# Patient Record
Sex: FEMALE | Race: BLACK OR AFRICAN AMERICAN | NOT HISPANIC OR LATINO | Employment: FULL TIME | URBAN - METROPOLITAN AREA
[De-identification: names, ages, dates, MRNs, and addresses within clinical notes are randomized per-mention and may not be internally consistent; named-entity substitution may affect disease eponyms.]

---

## 2017-07-19 DIAGNOSIS — M25.571 RIGHT ANKLE PAIN: Primary | ICD-10-CM

## 2017-07-20 ENCOUNTER — HOSPITAL ENCOUNTER (OUTPATIENT)
Dept: RADIOLOGY | Facility: HOSPITAL | Age: 27
Discharge: HOME | End: 2017-07-20
Attending: ORTHOPAEDIC SURGERY
Payer: OTHER GOVERNMENT

## 2017-07-20 DIAGNOSIS — M25.571 RIGHT ANKLE PAIN, UNSPECIFIED CHRONICITY: Primary | ICD-10-CM

## 2017-07-20 DIAGNOSIS — M25.571 RIGHT ANKLE PAIN, UNSPECIFIED CHRONICITY: ICD-10-CM

## 2017-07-20 PROCEDURE — 73610 X-RAY EXAM OF ANKLE: CPT | Mod: 26,RT,, | Performed by: RADIOLOGY

## 2017-07-20 PROCEDURE — 73610 X-RAY EXAM OF ANKLE: CPT | Mod: TC,PN,RT

## 2017-07-21 ENCOUNTER — HOSPITAL ENCOUNTER (OUTPATIENT)
Dept: RADIOLOGY | Facility: HOSPITAL | Age: 27
Discharge: HOME | End: 2017-07-21
Attending: NURSE PRACTITIONER
Payer: OTHER GOVERNMENT

## 2017-07-21 DIAGNOSIS — M25.571 RIGHT ANKLE PAIN: ICD-10-CM

## 2017-07-21 PROCEDURE — 73721 MRI JNT OF LWR EXTRE W/O DYE: CPT | Mod: 26,RT,, | Performed by: RADIOLOGY

## 2017-07-21 PROCEDURE — 73721 MRI JNT OF LWR EXTRE W/O DYE: CPT | Mod: TC,RT

## 2017-07-26 ENCOUNTER — OFFICE VISIT (OUTPATIENT)
Dept: ORTHOPEDICS | Facility: CLINIC | Age: 27
End: 2017-07-26
Payer: OTHER GOVERNMENT

## 2017-07-26 VITALS
BODY MASS INDEX: 31.57 KG/M2 | DIASTOLIC BLOOD PRESSURE: 55 MMHG | WEIGHT: 184.94 LBS | HEIGHT: 64 IN | HEART RATE: 78 BPM | SYSTOLIC BLOOD PRESSURE: 115 MMHG

## 2017-07-26 DIAGNOSIS — M25.571 CHRONIC PAIN OF RIGHT ANKLE: Primary | ICD-10-CM

## 2017-07-26 DIAGNOSIS — G89.29 CHRONIC PAIN OF RIGHT ANKLE: Primary | ICD-10-CM

## 2017-07-26 PROCEDURE — 99203 OFFICE O/P NEW LOW 30 MIN: CPT | Mod: 25,S$GLB,, | Performed by: ORTHOPAEDIC SURGERY

## 2017-07-26 PROCEDURE — 20605 DRAIN/INJ JOINT/BURSA W/O US: CPT | Mod: RT,S$GLB,, | Performed by: ORTHOPAEDIC SURGERY

## 2017-07-26 PROCEDURE — 3008F BODY MASS INDEX DOCD: CPT | Mod: S$GLB,,, | Performed by: ORTHOPAEDIC SURGERY

## 2017-07-26 PROCEDURE — 99999 PR PBB SHADOW E&M-EST. PATIENT-LVL III: CPT | Mod: PBBFAC,,, | Performed by: ORTHOPAEDIC SURGERY

## 2017-07-26 RX ADMIN — TRIAMCINOLONE ACETONIDE 40 MG: 40 INJECTION, SUSPENSION INTRA-ARTICULAR; INTRAMUSCULAR at 09:07

## 2017-08-03 RX ORDER — TRIAMCINOLONE ACETONIDE 40 MG/ML
40 INJECTION, SUSPENSION INTRA-ARTICULAR; INTRAMUSCULAR
Status: DISCONTINUED | OUTPATIENT
Start: 2017-07-26 | End: 2017-08-03 | Stop reason: HOSPADM

## 2017-08-04 NOTE — PROGRESS NOTES
History reviewed. No pertinent past medical history.    History reviewed. No pertinent surgical history.    Current Outpatient Prescriptions   Medication Sig    gabapentin (NEURONTIN) 300 MG capsule Take 300 mg by mouth 3 (three) times daily.    naproxen (NAPROSYN) 500 MG tablet Take 500 mg by mouth 2 (two) times daily.     No current facility-administered medications for this visit.        Review of patient's allergies indicates:  No Known Allergies    History reviewed. No pertinent family history.    Social History     Social History    Marital status:      Spouse name: N/A    Number of children: N/A    Years of education: N/A     Occupational History    Not on file.     Social History Main Topics    Smoking status: Never Smoker    Smokeless tobacco: Never Used    Alcohol use Not on file    Drug use: Unknown    Sexual activity: Not on file     Other Topics Concern    Not on file     Social History Narrative    No narrative on file       Chief Complaint:   Chief Complaint   Patient presents with    Right Ankle - Pain       Consulting Physician: Self, Aaareferral    History of present illness:    This is a 26 y.o. year old female who complains of right ankle pain following a twist on 5/30/17. Pain 4/10 and worse with activities. Pain lateral.    Review of Systems:    Constitution: Denies chills, fever, and sweats.  HENT: Denies headaches or blurry vision.  Cardiovascular: Denies chest pain or irregular heart beat.  Respiratory: Denies cough or shortness of breath.  Gastrointestinal: Denies abdominal pain, nausea, or vomiting.  Musculoskeletal:  Denies muscle cramps.  Neurological: Denies dizziness or focal weakness.  Psychiatric/Behavioral: Normal mental status.  Hematologic/Lymphatic: Denies bleeding problem or easy bruising/bleeding.  Skin: Denies rash or suspicious lesions.    Examination:    Vital Signs:    Vitals:    07/26/17 0954   BP: (!) 115/55   Pulse: 78   Weight: 83.9 kg (184 lb 15.5  "oz)   Height: 5' 4" (1.626 m)   PainSc:   4   PainLoc: Ankle       Body mass index is 31.75 kg/m².    This a well-developed, well nourished patient in no acute distress.    Alert and oriented and cooperative to examination.       Physical Exam: Right Ankle Exam     Gait:   Antalgic    Skin  Scars:   None  Rashes:  None    Inspection   Deformity:   None  Erythema:   Mild  Bruising:   none  Swelling:   Moderate  Lymphadenopathy: None    Instability:  None    Range of Motion Limited due to pain    Muscle Strength   Strength:  Not tested    Tests   Anterior drawer:  Stable  Varus tilt:  Not tested due to injury    Other   Ankle Crepitus:  Not tested due to injury  Sensation:   Normal  Achilles:  Normal  Forefoot:  Normal  Tenderness:  lateral malleoli/peroneals    Vascular Exam   Dorsalis Pedis:       Palpable  Capillary refill:    Normal          Imaging: X-rays ordered and reviewed today of the right ankle are normal. MRI shows a bone bruise and a possible partial peroneal tear.        Assessment: Chronic pain of right ankle  -     Intermediate Joint Aspiration/Injection        Plan:  We will start with an injection and a brace and see how she does. Back in 4 weeks if not better.      DISCLAIMER: This note may have been dictated using voice recognition software and may contain grammatical errors.     NOTE: Consult report sent to referring provider via EPIC EMR.  "

## 2017-08-04 NOTE — PROCEDURES
Intermediate Joint Aspiration/Injection  Date/Time: 7/26/2017 9:15 PM  Performed by: FLAVIO MERRITT  Authorized by: FLAVIO MERRITT     Consent Done?:  Yes (Verbal)  Indications:  Pain  Timeout: Prior to procedure the correct patient, procedure, and site was verified      Location:  Ankle  Site:  R ankle  Prep: Patient was prepped and draped in usual sterile fashion    Approach:  Anteromedial  Medications:  40 mg triamcinolone acetonide 40 mg/mL

## 2017-08-23 ENCOUNTER — OFFICE VISIT (OUTPATIENT)
Dept: ORTHOPEDICS | Facility: CLINIC | Age: 27
End: 2017-08-23
Payer: OTHER GOVERNMENT

## 2017-08-23 VITALS
HEIGHT: 64 IN | DIASTOLIC BLOOD PRESSURE: 59 MMHG | HEART RATE: 74 BPM | BODY MASS INDEX: 31.57 KG/M2 | WEIGHT: 184.94 LBS | SYSTOLIC BLOOD PRESSURE: 124 MMHG

## 2017-08-23 DIAGNOSIS — M25.571 CHRONIC PAIN OF RIGHT ANKLE: Primary | ICD-10-CM

## 2017-08-23 DIAGNOSIS — S93.401D SPRAIN OF RIGHT ANKLE, UNSPECIFIED LIGAMENT, SUBSEQUENT ENCOUNTER: Primary | ICD-10-CM

## 2017-08-23 DIAGNOSIS — G89.29 CHRONIC PAIN OF RIGHT ANKLE: Primary | ICD-10-CM

## 2017-08-23 PROCEDURE — 99999 PR PBB SHADOW E&M-EST. PATIENT-LVL III: CPT | Mod: PBBFAC,,, | Performed by: ORTHOPAEDIC SURGERY

## 2017-08-23 PROCEDURE — 3008F BODY MASS INDEX DOCD: CPT | Mod: S$GLB,,, | Performed by: ORTHOPAEDIC SURGERY

## 2017-08-23 PROCEDURE — 99213 OFFICE O/P EST LOW 20 MIN: CPT | Mod: S$GLB,,, | Performed by: ORTHOPAEDIC SURGERY

## 2017-08-24 NOTE — PROGRESS NOTES
History reviewed. No pertinent past medical history.    History reviewed. No pertinent surgical history.    Current Outpatient Prescriptions   Medication Sig    gabapentin (NEURONTIN) 300 MG capsule Take 300 mg by mouth 3 (three) times daily.    naproxen (NAPROSYN) 500 MG tablet Take 500 mg by mouth 2 (two) times daily.     No current facility-administered medications for this visit.        Review of patient's allergies indicates:  No Known Allergies    History reviewed. No pertinent family history.    Social History     Social History    Marital status:      Spouse name: N/A    Number of children: N/A    Years of education: N/A     Occupational History    Not on file.     Social History Main Topics    Smoking status: Never Smoker    Smokeless tobacco: Never Used    Alcohol use Not on file    Drug use: Unknown    Sexual activity: Not on file     Other Topics Concern    Not on file     Social History Narrative    No narrative on file       Chief Complaint:   Chief Complaint   Patient presents with    Right Ankle - Pain       Consulting Physician: No ref. provider found    History of present illness:    This is a 26 y.o. year old female who complains of right ankle pain following a twist on 5/30/17. Pain 3/10 and worse with activities. Pain lateral still. Better.    Review of Systems:    Constitution: Denies chills, fever, and sweats.  HENT: Denies headaches or blurry vision.  Cardiovascular: Denies chest pain or irregular heart beat.  Respiratory: Denies cough or shortness of breath.  Gastrointestinal: Denies abdominal pain, nausea, or vomiting.  Musculoskeletal:  Denies muscle cramps.  Neurological: Denies dizziness or focal weakness.  Psychiatric/Behavioral: Normal mental status.  Hematologic/Lymphatic: Denies bleeding problem or easy bruising/bleeding.  Skin: Denies rash or suspicious lesions.    Examination:    Vital Signs:    Vitals:    08/23/17 0954   BP: (!) 124/59   Pulse: 74   Weight:  "83.9 kg (184 lb 15.5 oz)   Height: 5' 4" (1.626 m)   PainSc:   3   PainLoc: Ankle       Body mass index is 31.75 kg/m².    This a well-developed, well nourished patient in no acute distress.    Alert and oriented and cooperative to examination.       Physical Exam: Right Ankle Exam     Gait:   Antalgic    Skin  Scars:   None  Rashes:  None    Inspection   Deformity:   None  Erythema:   Mild  Bruising:   none  Swelling:   mild  Lymphadenopathy: None    Instability:  None    Range of Motion full    Muscle Strength   Strength:  Normal    Tests   Anterior drawer:  Stable  Varus tilt:  Normal    Other   Ankle Crepitus:  None  Sensation:   Normal  Achilles:  Normal  Forefoot:  Normal  Tenderness:  lateral malleoli/peroneals    Vascular Exam   Dorsalis Pedis:       Palpable  Capillary refill:    Normal          Imaging: X-rays of the right ankle are normal. MRI shows a bone bruise and a possible partial peroneal tear.        Assessment: Chronic pain of right ankle        Plan: She is improving. Will start PT for strength and ROM. Continue brace as needed. Needs to avoid lift/push/pull and long standing at work. Back in 4 weeks.    DISCLAIMER: This note may have been dictated using voice recognition software and may contain grammatical errors.     NOTE: Consult report sent to referring provider via EPIC EMR.  "

## 2017-09-12 ENCOUNTER — CLINICAL SUPPORT (OUTPATIENT)
Dept: REHABILITATION | Facility: HOSPITAL | Age: 27
End: 2017-09-12
Attending: ORTHOPAEDIC SURGERY
Payer: OTHER GOVERNMENT

## 2017-09-12 DIAGNOSIS — S93.401D SPRAIN OF RIGHT ANKLE, UNSPECIFIED LIGAMENT, SUBSEQUENT ENCOUNTER: ICD-10-CM

## 2017-09-12 PROCEDURE — 97161 PT EVAL LOW COMPLEX 20 MIN: CPT | Mod: PN

## 2017-09-12 NOTE — PLAN OF CARE
"TIME RECORD    09/12/2017    Start Time:  0912  Stop Time:  0959    PROCEDURES:    TIMED  Procedure Time Min.    Start:  Stop:     Start:  Stop:     Start:  Stop:     Start:  Stop:          UNTIMED  Procedure Time Min.   Evaluation Start:0912  Stop:0959 47    Start:  Stop:      Total Timed Minutes:  0  Total Timed Units:  0  Total Untimed Units:  1  Charges Billed/# of units:  1    OUTPATIENT PHYSICAL THERAPY   PATIENT EVALUATION  Onset Date: DOI 05/30/17  Problem List Items Addressed This Visit     Right ankle sprain      Other Visit Diagnoses    None.       Treatment Diagnosis: Impaired mobility with right ankle pain    No past medical history on file.    No past surgical history on file.    has a current medication list which includes the following prescription(s): gabapentin and naproxen.    Precautions: standard  Surgical history: see above  Prior Therapy: no  History of Present Illness: Patient is a 27 yo F who presents to physical therapy with c/o right ankle pain with acute onset on 05/30/17. 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 Was barely able to drive afterwards.   Prior Level of Function: Independent  Social History:    Lives with 2 daughters 5 yo and 2 yo   Living environment: 2-story apartment [facility] (bedrooms on 2nd floor)    Stairs to enter home: 1    Railings: Inside   Employment: works for [employer], restricted at this time to 5hrs 6 days a week   Transportation: drives    Current level of function: Independent  Functional Deficits Leading to Referral/Nature of Injury: decreased tolerance to activity, impaired functional mobility including walking, standing, stair negotiation, work activities  Patient Therapy Goals: "Back to normal."      Subjective     Sue Yu states one or two episodes of ankle instability/LOB while walking, was able to catch her balance    Pain:  Location: lateral right ankle  Description: " aching  Activities Which Increase Pain: being on her feet (standing and walking)  Activities Which Decrease Pain: elevation, ice (not recently), was wearing a brace which helped, but states she stopped wearing it so she can strengthen her ankle  Pain Scale: 0/10 at best (first thing in the morning) 1/10 now  3/10 at worst    Numbness/Paraesthesias: denies    Objective     Appearance: mild swelling notable right ankle  Posture: In standing with mildly decreased weight shift to RLE vs LLE  Palpation: TTP lateral malleolus, peroneal tendons, DP pulse palpable  Sensation: grossly intact to LT  DTRs: NT  Range of Motion/Strength:      Ankle Right  Left  Pain/Dysfunction with Movement    PROM MMT PROM MMT    Dorsiflexion -4* 4/5 WFL 5/5    Plantarflexion 38* 4/5 WFL 4+/5    Inversion 26* 4-/5 WFL 4+/5    Eversion 14* 4-/5 WFL 4+/5      Unable to perform SLS to RLE, LLE >10 sec with minimal ankle strategy    Flexibility: Tightness present in bilateral gastrocs  Gait: mildly slowed  Bed Mobility: Supine<>sit Independent  Transfers: Sit<>stand Independent  Functional Outcome Measure: Lower Extremity Functional Scale (LEFS): 67/80  Treatment: Educated on role/goal PT, POC. Instructed in HEP including AROM right ankle DF/PF/IV/EV, towel scrunches, ankle alphabet. Handout issued. Pt verbalizing understanding and agreement.     Assessment       Initial Assessment (Pertinent finding, problem list and factors affecting outcome): Patient is a 27 yo F presenting to PT with c/o right ankle pain and weakness following a sprain on 05/30/17. Notable impairments include mild decreased ROM, ankle weakness, gait abnormality, and impaired functional mobility. Patient would benefit from skilled PT to address notable impairments and improve functional mobility to [facility].    Rehab Potiential: good    Short Term Goals (3 Weeks): 1) Pt will initiate HEP 2) Patient will improve right ankle strength by 1/2 muscle grade 3) Pt will be able to hold SLS  RLE for 5 sec   Long Term Goals (6 Weeks): 1) Pt will be I with HEP 2) Pt will return to community ambulation with strength 4+/5 or > and pain <2/10 3) Pt will improve LEFS to <20% impairment 4) Pt will be able to walk one mile and stand 1 hr with no difficulty I/ADL purposes    Plan     Certification Period: 09/12/17 to 10/26/17  Recommended Treatment Plan: 2-3 times per week for 6 weeks: Electrical Stimulation IFC PRN, Gait Training, Group Therapy, Manual Therapy, Moist Heat/ Ice, Neuromuscular Re-ed, Patient Education, Therapeutic Activites and Therapeutic Exercise    Thank you for referral.    Therapist: Nohemy Alejo, PT    I CERTIFY THE NEED FOR THESE SERVICES FURNISHED UNDER THIS PLAN OF TREATMENT AND WHILE UNDER MY CARE    Physician's comments: ________________________________________________________________________________________________________________________________________________      Physician's Name: ___________________________________;

## 2017-09-14 ENCOUNTER — CLINICAL SUPPORT (OUTPATIENT)
Dept: REHABILITATION | Facility: HOSPITAL | Age: 27
End: 2017-09-14
Attending: ORTHOPAEDIC SURGERY
Payer: OTHER GOVERNMENT

## 2017-09-14 DIAGNOSIS — S93.401D SPRAIN OF RIGHT ANKLE, UNSPECIFIED LIGAMENT, SUBSEQUENT ENCOUNTER: Primary | ICD-10-CM

## 2017-09-14 PROCEDURE — 97110 THERAPEUTIC EXERCISES: CPT | Mod: PN

## 2017-09-15 ENCOUNTER — OFFICE VISIT (OUTPATIENT)
Dept: ORTHOPEDICS | Facility: CLINIC | Age: 27
End: 2017-09-15
Payer: OTHER GOVERNMENT

## 2017-09-15 VITALS
HEIGHT: 64 IN | WEIGHT: 184.94 LBS | SYSTOLIC BLOOD PRESSURE: 127 MMHG | BODY MASS INDEX: 31.57 KG/M2 | HEART RATE: 65 BPM | DIASTOLIC BLOOD PRESSURE: 60 MMHG

## 2017-09-15 DIAGNOSIS — S93.401D SPRAIN OF RIGHT ANKLE, UNSPECIFIED LIGAMENT, SUBSEQUENT ENCOUNTER: Primary | ICD-10-CM

## 2017-09-15 PROCEDURE — 99999 PR PBB SHADOW E&M-EST. PATIENT-LVL III: CPT | Mod: PBBFAC,,, | Performed by: ORTHOPAEDIC SURGERY

## 2017-09-15 PROCEDURE — 99213 OFFICE O/P EST LOW 20 MIN: CPT | Mod: S$GLB,,, | Performed by: ORTHOPAEDIC SURGERY

## 2017-09-15 PROCEDURE — 3008F BODY MASS INDEX DOCD: CPT | Mod: S$GLB,,, | Performed by: ORTHOPAEDIC SURGERY

## 2017-09-18 PROBLEM — S93.401A RIGHT ANKLE SPRAIN: Status: ACTIVE | Noted: 2017-09-18

## 2017-09-18 NOTE — PROGRESS NOTES
"History reviewed. No pertinent past medical history.    History reviewed. No pertinent surgical history.    Current Outpatient Prescriptions   Medication Sig    gabapentin (NEURONTIN) 300 MG capsule Take 300 mg by mouth 3 (three) times daily.    naproxen (NAPROSYN) 500 MG tablet Take 500 mg by mouth 2 (two) times daily.     No current facility-administered medications for this visit.        Review of patient's allergies indicates:  No Known Allergies    History reviewed. No pertinent family history.    Social History     Social History    Marital status:      Spouse name: N/A    Number of children: N/A    Years of education: N/A     Occupational History    Not on file.     Social History Main Topics    Smoking status: Never Smoker    Smokeless tobacco: Never Used    Alcohol use Not on file    Drug use: Unknown    Sexual activity: Not on file     Other Topics Concern    Not on file     Social History Narrative    No narrative on file       Chief Complaint:   Chief Complaint   Patient presents with    Right Ankle - Pain       Consulting Physician: No ref. provider found    History of present illness:    This is a 26 y.o. year old female who complains of right ankle pain following a twist on 5/30/17. Pain 0/10.    Review of Systems:    Constitution: Denies chills, fever, and sweats.  HENT: Denies headaches or blurry vision.  Cardiovascular: Denies chest pain or irregular heart beat.  Respiratory: Denies cough or shortness of breath.  Gastrointestinal: Denies abdominal pain, nausea, or vomiting.  Musculoskeletal:  Denies muscle cramps.  Neurological: Denies dizziness or focal weakness.  Psychiatric/Behavioral: Normal mental status.  Hematologic/Lymphatic: Denies bleeding problem or easy bruising/bleeding.  Skin: Denies rash or suspicious lesions.    Examination:    Vital Signs:    Vitals:    09/15/17 0959   BP: 127/60   Pulse: 65   Weight: 83.9 kg (184 lb 15.5 oz)   Height: 5' 4" (1.626 m)   PainSc: " 0-No pain   PainLoc: Ankle       Body mass index is 31.75 kg/m².    This a well-developed, well nourished patient in no acute distress.    Alert and oriented and cooperative to examination.       Physical Exam: Right Ankle Exam     Gait:   normal    Skin  Scars:   None  Rashes:  None    Inspection   Deformity:   None  Erythema:   none  Bruising:   none  Swelling:   none  Lymphadenopathy: None    Instability:  None    Range of Motion full    Muscle Strength   Strength:  Normal    Tests   Anterior drawer:  Stable  Varus tilt:  Normal    Other   Ankle Crepitus:  None  Sensation:   Normal  Achilles:  Normal  Forefoot:  Normal  Tenderness:  lateral malleoli/peroneals    Vascular Exam   Dorsalis Pedis:       Palpable  Capillary refill:    Normal          Imaging: X-rays of the right ankle are normal. MRI shows a bone bruise and a possible partial peroneal tear.        Assessment: Sprain of right ankle, unspecified ligament, subsequent encounter        Plan: Doing well. No pain. Full ROM. Will release without restriction and see as needed.    DISCLAIMER: This note may have been dictated using voice recognition software and may contain grammatical errors.     NOTE: Consult report sent to referring provider via [employer].

## 2017-09-19 ENCOUNTER — TELEPHONE (OUTPATIENT)
Dept: REHABILITATION | Facility: HOSPITAL | Age: 27
End: 2017-09-19

## 2017-09-19 NOTE — PROGRESS NOTES
"Name: uSe Yu  Date:   09/14/17  Primary Diagnosis: .  Problem List Items Addressed This Visit     Right ankle sprain - Primary      Other Visit Diagnoses    None.         Time in: 0910  Time Out: 0955  Total Treatment Time: 45  Group Time: 0      Subjective:    Patient reports no changes in s/s since initial evalaution.  Patient reports their pain to be 0-1/10 on a 0-10 scale with 0 being no pain and 10 being the worst pain imaginable.    Objective    Patient received individual therapy to address strength, endurance, ROM and flexibility with 0 other patients with activities as follows:     Patient received therapeutic exercises to develop strength, endurance, ROM and flexibility for 45 minutes including:     Nustep L1 x 10 min   Standing gastroc stretch 3/30" B  Ankle 4-way c/ RTB 3x10  Gold board seated cw/ccw 3x10 each  Towel scrunches x 2 min    Assessment:     Patient tolerating treatment with no inc'd s/s. Good technique with cueing. Declines cold pack.    Pt will continue to benefit from skilled PT intervention. Medical Necessity is demonstrated by:  Pain limits function of effected part for some activities, Unable to participate fully in daily activities and Weakness.    Patient is making good progress towards established goals.    Plan:  Continue with established Plan of Care towards PT goals.     "

## 2025-04-04 ENCOUNTER — CLINICAL SUPPORT (OUTPATIENT)
Dept: OBSTETRICS AND GYNECOLOGY | Facility: CLINIC | Age: 35
End: 2025-04-04
Payer: MEDICAID

## 2025-04-04 ENCOUNTER — PATIENT MESSAGE (OUTPATIENT)
Dept: OBSTETRICS AND GYNECOLOGY | Facility: CLINIC | Age: 35
End: 2025-04-04

## 2025-04-04 DIAGNOSIS — Z3A.12 12 WEEKS GESTATION OF PREGNANCY: Primary | ICD-10-CM

## 2025-04-04 PROCEDURE — 99999 PR PBB SHADOW E&M-NEW PATIENT-LVL II: CPT | Mod: PBBFAC,,,

## 2025-04-04 PROCEDURE — 99202 OFFICE O/P NEW SF 15 MIN: CPT | Mod: PBBFAC

## 2025-04-04 NOTE — PROGRESS NOTES
Patient is currently 12 weeks 4 days. Patient is currently under care with [facility] until 20 weeks.   Spoke with patient for approximately 30 minutes during virtual visit.  Updated chart to reflect up to date patient demographics.  Allergies, medications, pharmacy, medical, surgical and family history updated. Substance and sexual activity, marital status, gender identity and OB/Gyn history updated.  Patient was guided through expectations of care during pregnancy.  first OB appts scheduled.  New pregnancy education provided & questions answered. Encouraged to send message or call office with any questions/concerns. Verbalized understanding.      Discussed with pt:     Lmp:1/6  Encouraged to begin  taking PNV daily    denies n/v:   common in 1st tri  discussed safe options per Pregnancy A to Z guide   denies cramping/spotting:   may be normal to have mild cramping/light spotting, harry in 1st tri & with sexual activity  Precautions/warning signs discussed:   encouraged to go to ED for heavy vag discharge, saturating a pad, bright red bleeding, painful cramping/abd pain that is interrupting daily activity  Encouraged and discussed healthy diet:   nothing raw; meat well done; heat deli meats & hot dogs prior to eating   avoid soft cheeses that are not fully pasteurized-ex: brie, feta, blue cheese   avoid fish high in mercury-limit canned tuna to once per week   rinse fruits/veggies thoroughly    Encouraged adequate fluids:  8-12 cups of water/healthy liquids each day  Limit caffeine to <200 mg/day (approx 1-2 cups coffee, tea or soda)   Extra rest can be beneficial, especially during the 1st trimester when it's normal to feel tired   Sleep on side rather than back or abdomen, especially as pregnancy progresses  Ok to continue to exercise but should not start anything new or more strenuous during pregnancy  Normal weight gain:  1st trimester-may not gain anything at all or up to 1-5 lbs   Total weight gain  approximately 25-35 lbs depending on starting weight   Should avoid alcohol, smoking, vaping, drugs, herbal supplements  Should check with provider regarding safety of any prescription or OTC medications  Avoid ibuprofen, motrin, advil, aleve  Ok to take regular strength tylenol for headache or body aches  Avoid cleaning cat litter box  Wear gloves with gardening   Important to wear seat belt when riding in vehicle-lower belt should be below abdomen   Will need car seat to bring baby home from hospital    Discussed normal breast changes during early pregnancy-breasts may feel full/tender   Discussed breastfeeding recommendations/benefits for mom & baby -  plans to breastfeed   Discussed need for pediatrician-will have to schedule baby's 1st appt prior to leaving the hospital to be seen within 2-3 days of d/c  Sent InJybe message to pt through pt portal with information regarding the [employer] [email or web address] for access to the Pregnancy A to Z guide and Prenatal Class schedule. Informed of the ConnectedMOM program, Get Ready to Meet Your Baby pamphlet, Co[employer] marcel and how to obtain a breast pump through insurance toward end of pregnancy

## 2025-04-22 ENCOUNTER — LAB VISIT (OUTPATIENT)
Dept: LAB | Facility: OTHER | Age: 35
End: 2025-04-22
Attending: NURSE PRACTITIONER
Payer: MEDICAID

## 2025-04-22 DIAGNOSIS — Z34.91 ENCOUNTER FOR SUPERVISION OF NORMAL PREGNANCY IN FIRST TRIMESTER, UNSPECIFIED GRAVIDITY: ICD-10-CM

## 2025-04-22 LAB
HBV SURFACE AG SERPL QL IA: NORMAL
HCT VFR BLD AUTO: 32.8 % (ref 37–48.5)
HGB BLD-MCNC: 10.4 GM/DL (ref 12–16)
HIV 1+2 AB+HIV1 P24 AG SERPL QL IA: NEGATIVE
INDIRECT COOMBS: NORMAL
RH BLD: NORMAL
SPECIMEN OUTDATE: NORMAL
T PALLIDUM IGG+IGM SER QL: NEGATIVE

## 2025-04-22 PROCEDURE — 85018 HEMOGLOBIN: CPT

## 2025-04-22 PROCEDURE — 87340 HEPATITIS B SURFACE AG IA: CPT

## 2025-04-22 PROCEDURE — 85014 HEMATOCRIT: CPT

## 2025-04-22 PROCEDURE — 86900 BLOOD TYPING SEROLOGIC ABO: CPT | Performed by: NURSE PRACTITIONER

## 2025-04-22 PROCEDURE — 87389 HIV-1 AG W/HIV-1&-2 AB AG IA: CPT

## 2025-04-22 PROCEDURE — 36415 COLL VENOUS BLD VENIPUNCTURE: CPT

## 2025-04-22 PROCEDURE — 86762 RUBELLA ANTIBODY: CPT

## 2025-04-22 PROCEDURE — 86593 SYPHILIS TEST NON-TREP QUANT: CPT

## 2025-04-23 LAB
RUBV IGG SER-ACNC: 56.9 IU/ML
RUBV IGG SER-IMP: REACTIVE

## 2025-05-09 ENCOUNTER — INITIAL PRENATAL (OUTPATIENT)
Dept: OBSTETRICS AND GYNECOLOGY | Facility: CLINIC | Age: 35
End: 2025-05-09
Payer: MEDICAID

## 2025-05-09 VITALS
BODY MASS INDEX: 36.39 KG/M2 | SYSTOLIC BLOOD PRESSURE: 124 MMHG | DIASTOLIC BLOOD PRESSURE: 72 MMHG | WEIGHT: 218.69 LBS

## 2025-05-09 DIAGNOSIS — Z3A.17 17 WEEKS GESTATION OF PREGNANCY: Primary | ICD-10-CM

## 2025-05-09 PROCEDURE — 99999 PR PBB SHADOW E&M-EST. PATIENT-LVL III: CPT | Mod: PBBFAC,,, | Performed by: OBSTETRICS & GYNECOLOGY

## 2025-05-09 PROCEDURE — 99213 OFFICE O/P EST LOW 20 MIN: CPT | Mod: PBBFAC,PN | Performed by: OBSTETRICS & GYNECOLOGY

## 2025-05-09 PROCEDURE — 99214 OFFICE O/P EST MOD 30 MIN: CPT | Mod: TH,S$PBB,, | Performed by: OBSTETRICS & GYNECOLOGY

## 2025-05-09 RX ORDER — PROGESTERONE 200 MG/1
200 CAPSULE ORAL NIGHTLY
COMMUNITY
Start: 2025-04-22

## 2025-05-12 NOTE — PROGRESS NOTES
Pregnancy dating, labs, ultrasound reports, prenatal testing, and problem list; prior records and results; and available outside records were reviewed and updated in EMR.  Pertinent findings were noted below.    Reason for Visit   Initial Prenatal Visit    HPI   34 y.o., at 17w6d by Estimated Date of Delivery: 10/13/25    She is doing well with no issues    Contractions: No   Bleeding: No   Loss of fluid: No   Fetal movement: No   Nausea: No   Vomiting: No   Headache: No     Exam   /72   Wt 99.2 kg (218 lb 11.1 oz)   BMI 36.39 kg/m²     GENERAL: No acute distress  ABD: Gravid    Assessment and Plan   17 weeks gestation of pregnancy  -     Connected MOM Enrollment  -     Assign Connected MOM Program Consent Questionnaire      Transfer from [facility]  H/O PTD on prometrium nightly  MFM scan scheduled     labor and Pain and bleeding precautions given  Follow-up: 4 weeks

## 2025-05-23 ENCOUNTER — PROCEDURE VISIT (OUTPATIENT)
Dept: MATERNAL FETAL MEDICINE | Facility: CLINIC | Age: 35
End: 2025-05-23
Payer: MEDICAID

## 2025-05-23 DIAGNOSIS — Z3A.12 12 WEEKS GESTATION OF PREGNANCY: ICD-10-CM

## 2025-05-23 DIAGNOSIS — Z36.2 ENCOUNTER FOR FOLLOW-UP ULTRASOUND OF FETAL ANATOMY: Primary | ICD-10-CM

## 2025-05-23 PROCEDURE — 76817 TRANSVAGINAL US OBSTETRIC: CPT | Mod: PBBFAC | Performed by: OBSTETRICS & GYNECOLOGY

## 2025-05-23 PROCEDURE — 76811 OB US DETAILED SNGL FETUS: CPT | Mod: 26,S$PBB,, | Performed by: OBSTETRICS & GYNECOLOGY

## 2025-05-30 ENCOUNTER — OFFICE VISIT (OUTPATIENT)
Dept: MATERNAL FETAL MEDICINE | Facility: CLINIC | Age: 35
End: 2025-05-30
Payer: MEDICAID

## 2025-05-30 ENCOUNTER — PROCEDURE VISIT (OUTPATIENT)
Dept: MATERNAL FETAL MEDICINE | Facility: CLINIC | Age: 35
End: 2025-05-30
Payer: MEDICAID

## 2025-05-30 VITALS
DIASTOLIC BLOOD PRESSURE: 60 MMHG | WEIGHT: 226.31 LBS | SYSTOLIC BLOOD PRESSURE: 121 MMHG | BODY MASS INDEX: 37.66 KG/M2

## 2025-05-30 DIAGNOSIS — Z36.2 ENCOUNTER FOR FOLLOW-UP ULTRASOUND OF FETAL ANATOMY: ICD-10-CM

## 2025-05-30 DIAGNOSIS — O09.899 H/O PRETERM DELIVERY, CURRENTLY PREGNANT: Primary | ICD-10-CM

## 2025-05-30 PROCEDURE — 99999 PR PBB SHADOW E&M-EST. PATIENT-LVL II: CPT | Mod: PBBFAC,,, | Performed by: OBSTETRICS & GYNECOLOGY

## 2025-05-30 PROCEDURE — 99212 OFFICE O/P EST SF 10 MIN: CPT | Mod: PBBFAC,TH | Performed by: OBSTETRICS & GYNECOLOGY

## 2025-05-30 RX ORDER — PROGESTERONE 200 MG/1
200 CAPSULE ORAL NIGHTLY
Qty: 30 CAPSULE | Refills: 4 | Status: SHIPPED | OUTPATIENT
Start: 2025-05-30

## 2025-05-30 NOTE — ASSESSMENT & PLAN NOTE
I counseled the patient regarding her obstetric history which is remarkable for a history of spontaneous PPROM at 35 weeks and another 36 weeks. Each delivered at around that time of the PPROM. These occurred at [facility]. She was on 17-OHP with her second pregnancy.  Recent evidence suggests that previously recommended formulations of progesterone therapy (17-alpha hydroxyprogesterone caproate) is ineffective for the prevention of recurrent  birth, and it has been withdrawn from the market at the direction of the FDA. Treatment with 17-alpha hydroxyprogesterone caproate (or its generic or compounded formulations) for prevention of recurrent  birth is not recommended. The MFM section continues to recommend cervical length screening between 16 - 22 weeks 6 days in baum gestations to identify patients who may benefit from cervical cerclage placement. The benefit of vaginal progesterone for the prevention of  birth is unclear in the absence of cervical shortening. In patients with a prior  birth and a cervical length of 25-30 mm, there is a non-significant trend to reduction in  birth, whereas in those with a cervical length < 25 mm, there appears to consistently be a reduction in the risk of premature birth.  Despite the lack of evidence that vaginal progesterone supplementation, in the form of prometrium 200 mg qhs, is beneficial in the absence of a short cervix, it may be considered after MFM consultation depending on the patient history and past use of 17-P. Whether there is additional incremental benefit of vaginal progesterone for patients who receive an ultrasound indicated cerclage remains unclear. The decision of whether to initiate or continue vaginal progesterone can be individualized.  Patient was on progesterone, however she was instructed to take it orally. She had been on it since 16 weeks, however she ran out of her Rx a few days ago and has not been on it. She would  like to restart it if possible, she was informed to use it vaginally instead.  We reviewed the reassuring CL on today's ultrasound.    Recommendations:  Continue cervical length surveillance every 2 weeks, until 22 weeks 6 days gestation  If the cervix measures <30 mm, perform weekly cervical length  Patients with a cervical length of 25 - 30 mm with a prior spontaneous PTB may benefit from vaginal progesterone, but this is less clear. Consider initiation of vaginal progesterone.  Patients with a cervical length < 25 mm may be offered cerclage, vaginal progesterone, or both after counseling with MFM.   Once a patient has a cerclage, no additional cervical length measurement is routinely recommended    Vaginal progesterone Rx sent. Continue until 36w6d.

## 2025-05-30 NOTE — PROGRESS NOTES
MATERNAL-FETAL MEDICINE   CONSULT NOTE    Provider requesting consultation: Aravind  Patient is accompanied by her mother     SUBJECTIVE:     Ms. Sue Yu is a 34 y.o.  female with IUP at 20w4d who is seen in consultation by MFM for evaluation and management of:  Problem   H/O  Delivery, Currently Pregnant     Patient has no complaints today. She is overall feeling well.  Patient denies any contractions/cramping, vaginal bleeding or leakage of fluid.       Medication List with Changes/Refills   Current Medications    FLUTICASONE (FLONASE) 50 MCG/ACTUATION NASAL SPRAY    1 spray (50 mcg total) by Each Nare route 2 (two) times daily as needed.    GABAPENTIN (NEURONTIN) 300 MG CAPSULE    Take 300 mg by mouth 3 (three) times daily.    NAPROXEN (NAPROSYN) 500 MG TABLET    Take 500 mg by mouth 2 (two) times daily.    ONDANSETRON (ZOFRAN) 4 MG TABLET    Take 1 tablet (4 mg total) by mouth every 6 (six) hours.    PROGESTERONE (PROMETRIUM) 200 MG CAPSULE    Take 200 mg by mouth every evening.      Review of patient's allergies indicates:  No Known Allergies    PMH: Past Medical History[1]   PObHx:  OB History    Para Term  AB Living   3 2  2  2   SAB IAB Ectopic Multiple Live Births       2      # Outcome Date GA Lbr Karri/2nd Weight Sex Type Anes PTL Lv   3 Current            2      F Vag-Spont   BOB   1      F Vag-Spont   BOB     PSH:Past Surgical History[2]     Family history:family history is not on file.    Social history: reports that she has never smoked. She has never used smokeless tobacco. She reports that she does not drink alcohol and does not use drugs.    Objective:   /60 (BP Location: Right arm, Patient Position: Sitting)   Wt 102.7 kg (226 lb 4.8 oz)   BMI 37.66 kg/m²     Physical Exam  deferred    Ultrasound performed. See viewpoint for full ultrasound report.  Hemphill live IUP.  Some of the previous suboptimal views are seen today and appear  unremarkable. Some of the anatomic structures remain suboptimally imaged (see above), but no abnormalities are suspected. Visualization of suboptimally imaged structures is unlikely to improve with advancing gestational age.   MVP is normal.   Transvaginal scan demonstrates a normal cervical length at 42.2 mm     Significant labs/imaging:  Aneuploidy screen: N/A    ASSESSMENT/PLAN:     34 y.o.  female with IUP at 20w4d     Assessment & Plan  H/O  delivery, currently pregnant  I counseled the patient regarding her obstetric history which is remarkable for a history of spontaneous PPROM at 35 weeks and another 36 weeks. Each delivered at around that time of the PPROM. These occurred at [facility]. She was on 17-OHP with her second pregnancy.  Recent evidence suggests that previously recommended formulations of progesterone therapy (17-alpha hydroxyprogesterone caproate) is ineffective for the prevention of recurrent  birth, and it has been withdrawn from the market at the direction of the FDA. Treatment with 17-alpha hydroxyprogesterone caproate (or its generic or compounded formulations) for prevention of recurrent  birth is not recommended. The MFM section continues to recommend cervical length screening between 16 - 22 weeks 6 days in baum gestations to identify patients who may benefit from cervical cerclage placement. The benefit of vaginal progesterone for the prevention of  birth is unclear in the absence of cervical shortening. In patients with a prior  birth and a cervical length of 25-30 mm, there is a non-significant trend to reduction in  birth, whereas in those with a cervical length < 25 mm, there appears to consistently be a reduction in the risk of premature birth.  Despite the lack of evidence that vaginal progesterone supplementation, in the form of prometrium 200 mg qhs, is beneficial in the absence of a short cervix, it may be considered after MFM  consultation depending on the patient history and past use of 17-P. Whether there is additional incremental benefit of vaginal progesterone for patients who receive an ultrasound indicated cerclage remains unclear. The decision of whether to initiate or continue vaginal progesterone can be individualized.  Patient was on progesterone, however she was instructed to take it orally. She had been on it since 16 weeks, however she ran out of her Rx a few days ago and has not been on it. She would like to restart it if possible, she was informed to use it vaginally instead.  We reviewed the reassuring CL on today's ultrasound.    Recommendations:  Continue cervical length surveillance every 2 weeks, until 22 weeks 6 days gestation  If the cervix measures <30 mm, perform weekly cervical length  Patients with a cervical length of 25 - 30 mm with a prior spontaneous PTB may benefit from vaginal progesterone, but this is less clear. Consider initiation of vaginal progesterone.  Patients with a cervical length < 25 mm may be offered cerclage, vaginal progesterone, or both after counseling with MFM.   Once a patient has a cerclage, no additional cervical length measurement is routinely recommended    Vaginal progesterone Rx sent. Continue until 36w6d.         Patient was counseled that prenatal ultrasound studies have limitations. They do not detect all fetal, genetic, placental, and maternal abnormalities. A normal appearing prenatal ultrasound is reassuring. However, it does not guarantee the absence of an abnormality or predict a normal outcome for the fetus or the mother.     FOLLOW UP:    No further MFM MD visits have been scheduled.  A follow up ultrasound will be made for 2 weeks from today for CL and completion anatomy.    The patient was given an opportunity to ask questions about the management of her high risk pregnancy problems. She expressed an understanding of and agreement to the above impression and plan. All  questions were answered to her satisfaction.        Lasha Arana MD   Maternal-Fetal Medicine      Electronically Signed by Lasha Arana May 30, 2025       [1] No past medical history on file.  [2] No past surgical history on file.

## 2025-06-06 ENCOUNTER — ROUTINE PRENATAL (OUTPATIENT)
Dept: OBSTETRICS AND GYNECOLOGY | Facility: CLINIC | Age: 35
End: 2025-06-06
Payer: MEDICAID

## 2025-06-06 VITALS — BODY MASS INDEX: 37.53 KG/M2 | WEIGHT: 225.5 LBS | DIASTOLIC BLOOD PRESSURE: 70 MMHG | SYSTOLIC BLOOD PRESSURE: 118 MMHG

## 2025-06-06 DIAGNOSIS — O92.5 LACTATING, SUPPRESSED: ICD-10-CM

## 2025-06-06 DIAGNOSIS — Z3A.21 21 WEEKS GESTATION OF PREGNANCY: Primary | ICD-10-CM

## 2025-06-06 PROCEDURE — 99212 OFFICE O/P EST SF 10 MIN: CPT | Mod: PBBFAC,TH,PN | Performed by: NURSE PRACTITIONER

## 2025-06-06 PROCEDURE — 99999 PR PBB SHADOW E&M-EST. PATIENT-LVL II: CPT | Mod: PBBFAC,,, | Performed by: NURSE PRACTITIONER

## 2025-06-12 ENCOUNTER — PROCEDURE VISIT (OUTPATIENT)
Dept: MATERNAL FETAL MEDICINE | Facility: CLINIC | Age: 35
End: 2025-06-12
Payer: MEDICAID

## 2025-06-12 DIAGNOSIS — O09.899 H/O PRETERM DELIVERY, CURRENTLY PREGNANT: ICD-10-CM

## 2025-06-12 PROCEDURE — 76815 OB US LIMITED FETUS(S): CPT | Mod: 26,S$PBB,, | Performed by: STUDENT IN AN ORGANIZED HEALTH CARE EDUCATION/TRAINING PROGRAM

## 2025-06-12 PROCEDURE — 76817 TRANSVAGINAL US OBSTETRIC: CPT | Mod: PBBFAC | Performed by: STUDENT IN AN ORGANIZED HEALTH CARE EDUCATION/TRAINING PROGRAM

## 2025-06-23 ENCOUNTER — PATIENT MESSAGE (OUTPATIENT)
Dept: OTHER | Facility: OTHER | Age: 35
End: 2025-06-23
Payer: MEDICAID

## 2025-06-30 ENCOUNTER — PATIENT MESSAGE (OUTPATIENT)
Dept: OBSTETRICS AND GYNECOLOGY | Facility: CLINIC | Age: 35
End: 2025-06-30
Payer: MEDICAID

## 2025-07-03 ENCOUNTER — ROUTINE PRENATAL (OUTPATIENT)
Dept: OBSTETRICS AND GYNECOLOGY | Facility: CLINIC | Age: 35
End: 2025-07-03
Payer: MEDICAID

## 2025-07-03 ENCOUNTER — LAB VISIT (OUTPATIENT)
Dept: LAB | Facility: HOSPITAL | Age: 35
End: 2025-07-03
Attending: NURSE PRACTITIONER
Payer: MEDICAID

## 2025-07-03 VITALS — SYSTOLIC BLOOD PRESSURE: 106 MMHG | WEIGHT: 230.19 LBS | BODY MASS INDEX: 38.3 KG/M2 | DIASTOLIC BLOOD PRESSURE: 54 MMHG

## 2025-07-03 DIAGNOSIS — Z3A.21 21 WEEKS GESTATION OF PREGNANCY: ICD-10-CM

## 2025-07-03 DIAGNOSIS — O36.62X0 EXCESSIVE FETAL GROWTH AFFECTING MANAGEMENT OF PREGNANCY IN SECOND TRIMESTER, SINGLE OR UNSPECIFIED FETUS: ICD-10-CM

## 2025-07-03 DIAGNOSIS — Z3A.25 25 WEEKS GESTATION OF PREGNANCY: Primary | ICD-10-CM

## 2025-07-03 LAB
ABSOLUTE EOSINOPHIL (OHS): 0.04 K/UL
ABSOLUTE MONOCYTE (OHS): 0.59 K/UL (ref 0.3–1)
ABSOLUTE NEUTROPHIL COUNT (OHS): 7.42 K/UL (ref 1.8–7.7)
BASOPHILS # BLD AUTO: 0.02 K/UL
BASOPHILS NFR BLD AUTO: 0.2 %
ERYTHROCYTE [DISTWIDTH] IN BLOOD BY AUTOMATED COUNT: 14.6 % (ref 11.5–14.5)
GLUCOSE SERPL-MCNC: 107 MG/DL (ref 70–140)
HCT VFR BLD AUTO: 29.6 % (ref 37–48.5)
HGB BLD-MCNC: 9 GM/DL (ref 12–16)
IMM GRANULOCYTES # BLD AUTO: 0.07 K/UL (ref 0–0.04)
IMM GRANULOCYTES NFR BLD AUTO: 0.7 % (ref 0–0.5)
LYMPHOCYTES # BLD AUTO: 1.2 K/UL (ref 1–4.8)
MCH RBC QN AUTO: 25.9 PG (ref 27–31)
MCHC RBC AUTO-ENTMCNC: 30.4 G/DL (ref 32–36)
MCV RBC AUTO: 85 FL (ref 82–98)
NUCLEATED RBC (/100WBC) (OHS): 0 /100 WBC
PLATELET # BLD AUTO: 343 K/UL (ref 150–450)
PMV BLD AUTO: 10 FL (ref 9.2–12.9)
RBC # BLD AUTO: 3.47 M/UL (ref 4–5.4)
RELATIVE EOSINOPHIL (OHS): 0.4 %
RELATIVE LYMPHOCYTE (OHS): 12.8 % (ref 18–48)
RELATIVE MONOCYTE (OHS): 6.3 % (ref 4–15)
RELATIVE NEUTROPHIL (OHS): 79.6 % (ref 38–73)
WBC # BLD AUTO: 9.34 K/UL (ref 3.9–12.7)

## 2025-07-03 PROCEDURE — 36415 COLL VENOUS BLD VENIPUNCTURE: CPT | Mod: PN

## 2025-07-03 PROCEDURE — 82950 GLUCOSE TEST: CPT

## 2025-07-03 PROCEDURE — 85025 COMPLETE CBC W/AUTO DIFF WBC: CPT

## 2025-07-03 PROCEDURE — 99212 OFFICE O/P EST SF 10 MIN: CPT | Mod: PBBFAC,TH,PN | Performed by: OBSTETRICS & GYNECOLOGY

## 2025-07-03 PROCEDURE — 99999 PR PBB SHADOW E&M-EST. PATIENT-LVL II: CPT | Mod: PBBFAC,,, | Performed by: OBSTETRICS & GYNECOLOGY

## 2025-07-03 NOTE — PROGRESS NOTES
Pregnancy dating, labs, ultrasound reports, prenatal testing, and problem list; prior records and results; and available outside records were reviewed and updated in EMR.  Pertinent findings were noted below.    Reason for Visit   Routine Prenatal Visit    HPI   34 y.o., at 25w3d by Estimated Date of Delivery: 10/13/25    NO PTL sx    Contractions: Occ non painful   Bleeding: No   Loss of fluid: No   Fetal movement: Yes   Nausea: No   Vomiting: No   Headache: No     Exam   BP (!) 106/54   Wt 104.4 kg (230 lb 2.6 oz)   BMI 38.30 kg/m²     GENERAL: No acute distress  ABD: Gravid    Assessment and Plan   25 weeks gestation of pregnancy  -     US MFM Procedure ([facility])-Future; Future    Excessive fetal growth affecting management of pregnancy in second trimester, single or unspecified fetus  -     US MFM Procedure ([facility])-Future; Future      [facility] BID     labor precautions given  Follow-up: 2 weeks

## 2025-07-05 ENCOUNTER — HOSPITAL ENCOUNTER (EMERGENCY)
Facility: OTHER | Age: 35
Discharge: HOME | End: 2025-07-05
Attending: OBSTETRICS & GYNECOLOGY
Payer: MEDICAID

## 2025-07-05 VITALS
SYSTOLIC BLOOD PRESSURE: 125 MMHG | TEMPERATURE: 98 F | OXYGEN SATURATION: 100 % | DIASTOLIC BLOOD PRESSURE: 58 MMHG | HEART RATE: 75 BPM | RESPIRATION RATE: 18 BRPM

## 2025-07-05 DIAGNOSIS — O26.899 ABDOMINAL PAIN AFFECTING PREGNANCY: ICD-10-CM

## 2025-07-05 DIAGNOSIS — Z3A.25 25 WEEKS GESTATION OF PREGNANCY: ICD-10-CM

## 2025-07-05 DIAGNOSIS — O99.891 BACK PAIN AFFECTING PREGNANCY: Primary | ICD-10-CM

## 2025-07-05 DIAGNOSIS — M54.9 BACK PAIN AFFECTING PREGNANCY: Primary | ICD-10-CM

## 2025-07-05 DIAGNOSIS — R10.9 ABDOMINAL PAIN AFFECTING PREGNANCY: ICD-10-CM

## 2025-07-05 LAB
BILIRUBIN, POC UA: NEGATIVE
BLOOD, POC UA: NEGATIVE
CLARITY, UA: CLEAR
COLOR, UA: YELLOW
GLUCOSE, POC UA: NEGATIVE
KETONES, POC UA: NEGATIVE
LEUKOCYTE EST, POC UA: NEGATIVE
NITRITE, POC UA: NEGATIVE
PH UR STRIP: 6.5 [PH] (ref 5–8)
PROTEIN, POC UA: ABNORMAL
SPECIFIC GRAVITY, POC UA: 1.02 (ref 1–1.03)
UROBILINOGEN, POC UA: 0.2 E.U./DL

## 2025-07-05 PROCEDURE — 59025 FETAL NON-STRESS TEST: CPT

## 2025-07-05 PROCEDURE — 99284 EMERGENCY DEPT VISIT MOD MDM: CPT | Mod: 25

## 2025-07-05 PROCEDURE — 25000003 PHARM REV CODE 250

## 2025-07-05 RX ORDER — ACETAMINOPHEN 500 MG
1000 TABLET ORAL ONCE
Status: COMPLETED | OUTPATIENT
Start: 2025-07-05 | End: 2025-07-05

## 2025-07-05 RX ADMIN — ACETAMINOPHEN 1000 MG: 500 TABLET, FILM COATED ORAL at 10:07

## 2025-07-05 NOTE — ED PROVIDER NOTES
Encounter Date: 2025       History     Chief Complaint   Patient presents with    Abdominal Pain    Back Pain     Sue Yu is a 34 y.o. [id number] at 25w5d presents complaining of back pain. She reports that she has had severe back pain since yesterday evening. There was no inciting event, but the pain has steadily worsened. She describes the pain as a sharp, shooting like pain down her back. Minimal radiation around the sides to the abdomen. She endorses mild intermittent cramps but reports that the back pain is what's primarily bothering her today.    This IUP is complicated by history PTB at 35w and 36w.  Patient denies contractions, denies vaginal bleeding, denies LOF.   Fetal Movement: normal.       Review of patient's allergies indicates:  No Known Allergies  No past medical history on file.  No past surgical history on file.  Family History   Problem Relation Name Age of Onset    Breast cancer Neg Hx      Colon cancer Neg Hx      Ovarian cancer Neg Hx      Uterine cancer Neg Hx      Cervical cancer Neg Hx       Social History[1]  Review of Systems   Constitutional:  Negative for chills and fever.   HENT:  Negative for congestion and sore throat.    Eyes:  Negative for visual disturbance.   Respiratory:  Negative for cough and shortness of breath.    Cardiovascular:  Negative for chest pain and palpitations.   Gastrointestinal:  Negative for abdominal pain, constipation, diarrhea and nausea.   Genitourinary:  Negative for dysuria, hematuria, vaginal bleeding and vaginal discharge.   Musculoskeletal:  Positive for back pain.   Skin:  Negative for rash.   Neurological:  Negative for dizziness, light-headedness and headaches.       Physical Exam     Initial Vitals   BP Pulse Resp Temp SpO2   25 1013 25 1007 25 1013 25 1013 25 1007   (!) 130/58 90 18 97.9 °F (36.6 °C) 98 %      MAP       --                Physical Exam    Vitals reviewed.  Constitutional: She appears  well-developed and well-nourished.   HENT:   Head: Normocephalic and atraumatic.   Eyes: EOM are normal.   Cardiovascular:  Normal rate.           Pulmonary/Chest: No respiratory distress.   Abdominal: Abdomen is soft. She exhibits no distension. There is no abdominal tenderness.   Gravid abdomen There is no rebound.   Genitourinary:    Genitourinary Comments: SVE: cl/th/hi     Musculoskeletal:         General: No tenderness or edema.     Neurological: She is alert and oriented to person, place, and time.   Skin: Skin is warm and dry.   Psychiatric: She has a normal mood and affect.         ED Course   Obtain Fetal nonstress test (NST)    Date/Time: 2025 11:05 AM    Performed by: Charline Fierro MD  Authorized by: Charline Fierro MD    Nonstress Test:     Variability:  6-25 BPM    Decelerations:  None    Baseline:  150    Uterine Irritability: No      Contractions:  Not present  Biophysical Profile:     Nonstress Test Interpretation: appropriate for gestational age      Overall Impression:  Reassuring  Post-procedure:     Patient tolerance:  Patient tolerated the procedure well with no immediate complications    Labs Reviewed   POCT URINALYSIS W/O SCOPE - Abnormal       Result Value    Spec Grav UA 1.025      PH, UA 6.5      Protein, UA Trace (*)     Glucose, UA Negative      Ketones, UA Negative      Bilirubin, UA Negative      Blood, UA Negative      Leukocytes, UA Negative      Nitrite, UA Negative      Urobilinogen, UA 0.2      Color, UA POC Yellow      Clarity, UA, POC Clear            Imaging Results    None          Medications   acetaminophen tablet 1,000 mg (1,000 mg Oral Given 25 1049)     Medical Decision Making  Sue Yu is a 34 y.o. [id number] at 25w5d presents complaining of back pain.     Temp:  [97.9 °F (36.6 °C)] 97.9 °F (36.6 °C)  Pulse:  [] 75  Resp:  [18] 18  SpO2:  [98 %-100 %] 100 %  BP: (130-132)/(58-61) 132/61    SVE: cl/th/hi  NST: Reassuring and appropriate for  gestational age   TOCO: quiet    Back pain:  - Tylenol 1g PO   - Heating pads   - PO hydration   - Pain improved    - Patient stable for discharge at this time  - ED return precautions given  - She voiced understanding and is in agreement with the plan    Charline Fierro M.D.  Obstetrics and Gynecology  PGY-1     Risk  OTC drugs.              Attending Attestation:   Physician Attestation Statement for Resident:  As the supervising MD   Physician Attestation Statement: I have personally seen and examined this patient.   I agree with the above history.  -:   As the supervising MD I agree with the above PE.     As the supervising MD I agree with the above treatment, course, plan, and disposition.   I was personally present during the critical portions of the procedure(s) performed by the resident and was immediately available in the ED to provide services and assistance as needed during the entire procedure.  I have reviewed and agree with the residents interpretation of the following: lab data.  I have reviewed the following: old records at this facility.            Attending ED Notes:   I saw and examined the patient myself along with the resident. I have personally reviewed pertinent prior records, labs, imaging, and procedures. I have reviewed the documentation and agree with the findings and plan as documented.      at 25w5d presenting with back pain. AFVSS, benign exam, cervix closed. NST reassuring and appropriate for gestational age, tocometry without contractions. Pain improved with Tylenol, heat packs, and PO hydration. Discharge home in stable condition. Return precautions discussed.      Karol Chew MD FACOG  OB Hospitalist                                 Clinical Impression:  Final diagnoses:  [O99.891, M54.9] Back pain affecting pregnancy (Primary)  [O26.899, R10.9] Abdominal pain affecting pregnancy  [Z3A.25] 25 weeks gestation of pregnancy          ED Disposition Condition    Discharge  Stable          ED Prescriptions    None       Follow-up Information       Follow up With Specialties Details Why Contact Info    [facility] - Emergency Dept (Obstetrics) Emergency Medicine  If symptoms worsen [address]  [facility] [id number] [phone]    Ruth Nazario MD Obstetrics, Obstetrics and Gynecology  As needed, As scheduled [address] [address]  [phone]                     [1]   Social History  Tobacco Use    Smoking status: Never    Smokeless tobacco: Never   Substance Use Topics    Alcohol use: No    Drug use: Never        Karol Chew MD  07/08/25 1157

## 2025-07-07 ENCOUNTER — PATIENT MESSAGE (OUTPATIENT)
Dept: OTHER | Facility: OTHER | Age: 35
End: 2025-07-07
Payer: MEDICAID

## 2025-07-07 ENCOUNTER — PATIENT MESSAGE (OUTPATIENT)
Dept: OBSTETRICS AND GYNECOLOGY | Facility: CLINIC | Age: 35
End: 2025-07-07
Payer: MEDICAID

## 2025-07-07 DIAGNOSIS — O99.012 ANEMIA DURING PREGNANCY IN SECOND TRIMESTER: Primary | ICD-10-CM

## 2025-07-07 RX ORDER — IRON POLYSACCHARIDE COMPLEX 150 MG
150 CAPSULE ORAL 2 TIMES DAILY
Qty: 30 CAPSULE | Refills: 6 | Status: SHIPPED | OUTPATIENT
Start: 2025-07-07

## 2025-07-11 ENCOUNTER — PROCEDURE VISIT (OUTPATIENT)
Dept: MATERNAL FETAL MEDICINE | Facility: CLINIC | Age: 35
End: 2025-07-11
Payer: MEDICAID

## 2025-07-11 DIAGNOSIS — O36.62X0 EXCESSIVE FETAL GROWTH AFFECTING MANAGEMENT OF PREGNANCY IN SECOND TRIMESTER, SINGLE OR UNSPECIFIED FETUS: ICD-10-CM

## 2025-07-11 DIAGNOSIS — Z3A.25 25 WEEKS GESTATION OF PREGNANCY: ICD-10-CM

## 2025-07-11 PROCEDURE — 76816 OB US FOLLOW-UP PER FETUS: CPT | Mod: PBBFAC | Performed by: OBSTETRICS & GYNECOLOGY

## 2025-07-21 ENCOUNTER — PATIENT MESSAGE (OUTPATIENT)
Dept: OTHER | Facility: OTHER | Age: 35
End: 2025-07-21
Payer: MEDICAID

## 2025-07-25 ENCOUNTER — ROUTINE PRENATAL (OUTPATIENT)
Dept: OBSTETRICS AND GYNECOLOGY | Facility: CLINIC | Age: 35
End: 2025-07-25
Payer: MEDICAID

## 2025-07-25 VITALS — WEIGHT: 229.5 LBS | BODY MASS INDEX: 38.19 KG/M2 | DIASTOLIC BLOOD PRESSURE: 68 MMHG | SYSTOLIC BLOOD PRESSURE: 124 MMHG

## 2025-07-25 DIAGNOSIS — O92.5 LACTATING, SUPPRESSED: ICD-10-CM

## 2025-07-25 DIAGNOSIS — Z23 NEED FOR DIPHTHERIA-TETANUS-PERTUSSIS (TDAP) VACCINE: ICD-10-CM

## 2025-07-25 DIAGNOSIS — Z3A.28 28 WEEKS GESTATION OF PREGNANCY: Primary | ICD-10-CM

## 2025-07-25 PROCEDURE — 99999 PR PBB SHADOW E&M-EST. PATIENT-LVL II: CPT | Mod: PBBFAC,,, | Performed by: NURSE PRACTITIONER

## 2025-07-25 PROCEDURE — 99212 OFFICE O/P EST SF 10 MIN: CPT | Mod: PBBFAC,TH,PN | Performed by: NURSE PRACTITIONER

## 2025-07-25 NOTE — PROGRESS NOTES
Here for routine OB appt at 28w4d, with no complaints.  Reports good FM.  Denies LOF, denies VB, denies contractions.  Reviewed warning signs of Labor and Preeclampsia.  Daily FM counts reinforced.  Discussed Tdap- will get at [facility].   It's a boy- desires circ. She desires PP BTL.   Discussed pediatrician. Plans to breastfeed- RX for pump given.   F/U scheduled 2 weeks

## 2025-08-04 ENCOUNTER — PATIENT MESSAGE (OUTPATIENT)
Dept: OTHER | Facility: OTHER | Age: 35
End: 2025-08-04
Payer: MEDICAID

## 2025-08-14 ENCOUNTER — ROUTINE PRENATAL (OUTPATIENT)
Dept: OBSTETRICS AND GYNECOLOGY | Facility: CLINIC | Age: 35
End: 2025-08-14
Payer: MEDICAID

## 2025-08-14 ENCOUNTER — LAB VISIT (OUTPATIENT)
Dept: LAB | Facility: HOSPITAL | Age: 35
End: 2025-08-14
Payer: MEDICAID

## 2025-08-14 VITALS — BODY MASS INDEX: 39.11 KG/M2 | WEIGHT: 235 LBS | DIASTOLIC BLOOD PRESSURE: 70 MMHG | SYSTOLIC BLOOD PRESSURE: 128 MMHG

## 2025-08-14 DIAGNOSIS — Z3A.31 31 WEEKS GESTATION OF PREGNANCY: Primary | ICD-10-CM

## 2025-08-14 DIAGNOSIS — O99.012 ANEMIA DURING PREGNANCY IN SECOND TRIMESTER: ICD-10-CM

## 2025-08-14 DIAGNOSIS — Z87.51 HISTORY OF PRETERM DELIVERY: ICD-10-CM

## 2025-08-14 DIAGNOSIS — Z30.2 ENCOUNTER FOR STERILIZATION: ICD-10-CM

## 2025-08-14 DIAGNOSIS — O36.63X0 EXCESSIVE FETAL GROWTH AFFECTING MANAGEMENT OF PREGNANCY IN THIRD TRIMESTER, SINGLE OR UNSPECIFIED FETUS: ICD-10-CM

## 2025-08-14 DIAGNOSIS — Z3A.31 31 WEEKS GESTATION OF PREGNANCY: ICD-10-CM

## 2025-08-14 LAB
IRON SATN MFR SERPL: 5 % (ref 20–50)
IRON SERPL-MCNC: 38 UG/DL (ref 30–160)
TIBC SERPL-MCNC: 728 UG/DL (ref 250–450)
TRANSFERRIN SERPL-MCNC: 492 MG/DL (ref 200–375)

## 2025-08-14 PROCEDURE — 99999 PR PBB SHADOW E&M-EST. PATIENT-LVL III: CPT | Mod: PBBFAC,,, | Performed by: OBSTETRICS & GYNECOLOGY

## 2025-08-14 PROCEDURE — 36415 COLL VENOUS BLD VENIPUNCTURE: CPT | Mod: PN

## 2025-08-14 PROCEDURE — 83540 ASSAY OF IRON: CPT

## 2025-08-14 PROCEDURE — 99213 OFFICE O/P EST LOW 20 MIN: CPT | Mod: PBBFAC,TH,PN | Performed by: OBSTETRICS & GYNECOLOGY

## 2025-08-14 RX ORDER — IRON POLYSACCHARIDE COMPLEX 150 MG
150 CAPSULE ORAL 2 TIMES DAILY
Qty: 30 CAPSULE | Refills: 6 | Status: SHIPPED | OUTPATIENT
Start: 2025-08-14

## 2025-08-18 ENCOUNTER — PATIENT MESSAGE (OUTPATIENT)
Dept: OTHER | Facility: OTHER | Age: 35
End: 2025-08-18
Payer: MEDICAID

## 2025-08-29 ENCOUNTER — ROUTINE PRENATAL (OUTPATIENT)
Dept: OBSTETRICS AND GYNECOLOGY | Facility: CLINIC | Age: 35
End: 2025-08-29
Payer: MEDICAID

## 2025-08-29 VITALS — SYSTOLIC BLOOD PRESSURE: 116 MMHG | BODY MASS INDEX: 40.5 KG/M2 | DIASTOLIC BLOOD PRESSURE: 73 MMHG | WEIGHT: 243.38 LBS

## 2025-08-29 DIAGNOSIS — O36.63X0 EXCESSIVE FETAL GROWTH AFFECTING MANAGEMENT OF PREGNANCY IN THIRD TRIMESTER, SINGLE OR UNSPECIFIED FETUS: ICD-10-CM

## 2025-08-29 DIAGNOSIS — Z3A.33 33 WEEKS GESTATION OF PREGNANCY: Primary | ICD-10-CM

## 2025-08-29 PROCEDURE — 99213 OFFICE O/P EST LOW 20 MIN: CPT | Mod: PBBFAC,TH | Performed by: OBSTETRICS & GYNECOLOGY

## 2025-08-29 PROCEDURE — 99999 PR PBB SHADOW E&M-EST. PATIENT-LVL III: CPT | Mod: PBBFAC,,, | Performed by: OBSTETRICS & GYNECOLOGY

## 2025-09-03 ENCOUNTER — PROCEDURE VISIT (OUTPATIENT)
Dept: MATERNAL FETAL MEDICINE | Facility: CLINIC | Age: 35
End: 2025-09-03
Payer: MEDICAID

## 2025-09-03 DIAGNOSIS — Z3A.33 33 WEEKS GESTATION OF PREGNANCY: ICD-10-CM

## 2025-09-03 DIAGNOSIS — O36.63X0 EXCESSIVE FETAL GROWTH AFFECTING MANAGEMENT OF PREGNANCY IN THIRD TRIMESTER, SINGLE OR UNSPECIFIED FETUS: ICD-10-CM

## 2025-09-03 PROCEDURE — 76816 OB US FOLLOW-UP PER FETUS: CPT | Mod: PBBFAC | Performed by: OBSTETRICS & GYNECOLOGY
